# Patient Record
Sex: MALE | Race: BLACK OR AFRICAN AMERICAN | NOT HISPANIC OR LATINO | Employment: FULL TIME | RURAL
[De-identification: names, ages, dates, MRNs, and addresses within clinical notes are randomized per-mention and may not be internally consistent; named-entity substitution may affect disease eponyms.]

---

## 2018-08-29 ENCOUNTER — OFFICE VISIT (OUTPATIENT)
Dept: OTOLARYNGOLOGY | Facility: CLINIC | Age: 28
End: 2018-08-29

## 2018-08-29 ENCOUNTER — PREP FOR SURGERY (OUTPATIENT)
Dept: OTHER | Facility: HOSPITAL | Age: 28
End: 2018-08-29

## 2018-08-29 VITALS — BODY MASS INDEX: 30.05 KG/M2 | HEIGHT: 66 IN | WEIGHT: 187 LBS | TEMPERATURE: 97.8 F

## 2018-08-29 DIAGNOSIS — J34.3 NASAL TURBINATE HYPERTROPHY: ICD-10-CM

## 2018-08-29 DIAGNOSIS — J34.89 NASAL VALVE COLLAPSE: ICD-10-CM

## 2018-08-29 DIAGNOSIS — J34.2 NASAL SEPTAL DEVIATION: ICD-10-CM

## 2018-08-29 DIAGNOSIS — J34.2 NASAL SEPTAL DEVIATION: Primary | ICD-10-CM

## 2018-08-29 DIAGNOSIS — J34.89 NASAL VALVE COLLAPSE: Primary | ICD-10-CM

## 2018-08-29 PROCEDURE — 99204 OFFICE O/P NEW MOD 45 MIN: CPT | Performed by: OTOLARYNGOLOGY

## 2018-08-29 RX ORDER — OXYMETAZOLINE HYDROCHLORIDE 0.05 G/100ML
2 SPRAY NASAL
Status: CANCELLED | OUTPATIENT
Start: 2018-09-14 | End: 2018-09-17

## 2018-08-29 RX ORDER — CLINDAMYCIN PHOSPHATE 900 MG/50ML
900 INJECTION INTRAVENOUS ONCE
Status: CANCELLED | OUTPATIENT
Start: 2018-09-14 | End: 2018-09-14

## 2018-08-29 NOTE — PROGRESS NOTES
Subjective   Markell Pantoja is a 28 y.o. male.    nasal obstruction  History of Present Illness     The patient has had many treatments antihistamines steroids and treatments for sinusitis and nasal obstruction allergies and had allergy shots.  He says CT which showed no evidence of active sinusitis but had rolled system arms bilateral sinuses.  Concerned because of mouth breathing dry mouth difficulty breathing through his nose on a chronic basis.  He doesn't want continue further medical therapies been treated by an allergist as well as others for this for many years he notes specifically history of trauma cannot breathe well through either side of his nose he has trouble with his sense of smell he has migraines which which is treated separately  Been diagnosed with nasal obstruction secondary to deviated septum and turbinate hypertrophy  The following portions of the patient's history were reviewed and updated as appropriate: allergies, current medications, past family history, past medical history, past social history, past surgical history and problem list.      Markell Pantoja reports that he has quit smoking. He has never used smokeless tobacco. He reports that he does not drink alcohol.  Patient is not a tobacco user and has been counseled for use of tobacco products    History reviewed. No pertinent family history.    No current outpatient prescriptions on file.    No Known Allergies    History reviewed. No pertinent past medical history.      Review of Systems   Constitutional: Negative for fever.   HENT: Positive for congestion, postnasal drip and rhinorrhea. Negative for facial swelling.    Neurological: Positive for headaches.        Very poor sense of smell   All other systems reviewed and are negative.          Objective   Physical Exam   Constitutional: He is oriented to person, place, and time. He appears well-developed and well-nourished.   HENT:   Head: Normocephalic and atraumatic.    Right Ear: Hearing, tympanic membrane, external ear and ear canal normal.   Left Ear: Hearing, tympanic membrane, external ear and ear canal normal.   Nose: Mucosal edema, nasal deformity and septal deviation present. No rhinorrhea. No epistaxis. Right sinus exhibits no maxillary sinus tenderness and no frontal sinus tenderness. Left sinus exhibits no maxillary sinus tenderness and no frontal sinus tenderness.       Mouth/Throat: Uvula is midline and oropharynx is clear and moist. No trismus in the jaw. Normal dentition. No oropharyngeal exudate or posterior oropharyngeal edema.   Eyes: Conjunctivae are normal.   Neck: Trachea normal, normal range of motion and phonation normal. Neck supple. No JVD present. No tracheal deviation present. No thyromegaly present.   Cardiovascular: Normal rate and regular rhythm.    Pulmonary/Chest: Effort normal and breath sounds normal.   Musculoskeletal: Normal range of motion.   Lymphadenopathy:        Head (right side): No submental, no submandibular, no tonsillar, no preauricular, no posterior auricular and no occipital adenopathy present.        Head (left side): No submental, no submandibular, no tonsillar, no preauricular, no posterior auricular and no occipital adenopathy present.     He has no cervical adenopathy.        Right cervical: No superficial cervical, no deep cervical and no posterior cervical adenopathy present.       Left cervical: No superficial cervical, no deep cervical and no posterior cervical adenopathy present.   Neurological: He is alert and oriented to person, place, and time. No cranial nerve deficit.   Skin: Skin is warm.   Psychiatric: He has a normal mood and affect. His speech is normal and behavior is normal. Thought content normal.   Nursing note and vitals reviewed.      [facility] and treatments and also description of the CT scan and these were all reviewed      Assessment/Plan    Markell was seen today for deviated septum.    Diagnoses and all orders for this visit:    Nasal valve collapse    Nasal turbinate hypertrophy    Nasal septal deviation        Mentioned further medical therapy though after mother trauma the medications.  He's Mahamed because his failed therapy with an allergist wants to consider surgery.  Explained that we'll do surgery carries sense of smell that there is a good success rate but is not perfect there are risk of surgery including change in appearance, infection, bleeding, septal perforation, delayed bleeding chronic pain discomfort failure to improve need for further surgery he understands wants to go through surgery all questions were answered to sit up near future.  He states is otherwise healthy had previous surgeon anesthesia without complication  Explained and its not likely this headaches which are probably migrainous will be improved at all by the surgery he understands and accepts.  He denies any irregular bleeding tendency or family history of same

## 2018-08-29 NOTE — PATIENT INSTRUCTIONS

## 2018-09-10 ENCOUNTER — APPOINTMENT (OUTPATIENT)
Dept: PREADMISSION TESTING | Facility: HOSPITAL | Age: 28
End: 2018-09-10

## 2018-09-10 VITALS
HEART RATE: 66 BPM | WEIGHT: 185 LBS | OXYGEN SATURATION: 98 % | BODY MASS INDEX: 29.73 KG/M2 | RESPIRATION RATE: 12 BRPM | SYSTOLIC BLOOD PRESSURE: 102 MMHG | HEIGHT: 66 IN | DIASTOLIC BLOOD PRESSURE: 68 MMHG

## 2018-09-10 RX ORDER — SODIUM CHLORIDE, SODIUM GLUCONATE, SODIUM ACETATE, POTASSIUM CHLORIDE, AND MAGNESIUM CHLORIDE 526; 502; 368; 37; 30 MG/100ML; MG/100ML; MG/100ML; MG/100ML; MG/100ML
1000 INJECTION, SOLUTION INTRAVENOUS CONTINUOUS
Status: CANCELLED | OUTPATIENT
Start: 2018-09-14

## 2018-09-10 RX ORDER — ACETAMINOPHEN 500 MG
500 TABLET ORAL EVERY 6 HOURS PRN
COMMUNITY

## 2018-09-10 RX ORDER — ALBUTEROL SULFATE 90 UG/1
2 AEROSOL, METERED RESPIRATORY (INHALATION) EVERY 4 HOURS PRN
COMMUNITY

## 2018-09-10 RX ORDER — IBUPROFEN 200 MG
200 TABLET ORAL EVERY 6 HOURS PRN
COMMUNITY
End: 2018-09-14 | Stop reason: HOSPADM

## 2018-09-13 ENCOUNTER — ANESTHESIA EVENT (OUTPATIENT)
Dept: PERIOP | Facility: HOSPITAL | Age: 28
End: 2018-09-13

## 2018-09-13 NOTE — H&P
Subjective      Markell Pantoja is a 28 y.o. male.    nasal obstruction  History of Present Illness      The patient has had many treatments antihistamines steroids and treatments for sinusitis and nasal obstruction allergies and had allergy shots.  He says CT which showed no evidence of active sinusitis but had rolled system arms bilateral sinuses.  Concerned because of mouth breathing dry mouth difficulty breathing through his nose on a chronic basis.  He doesn't want continue further medical therapies been treated by an allergist as well as others for this for many years he notes specifically history of trauma cannot breathe well through either side of his nose he has trouble with his sense of smell he has migraines which which is treated separately  Been diagnosed with nasal obstruction secondary to deviated septum and turbinate hypertrophy  The following portions of the patient's history were reviewed and updated as appropriate: allergies, current medications, past family history, past medical history, past social history, past surgical history and problem list.        Markell Pantoja reports that he has quit smoking. He has never used smokeless tobacco. He reports that he does not drink alcohol.  Patient is not a tobacco user and has been counseled for use of tobacco products     History reviewed. No pertinent family history.     No current outpatient prescriptions on file.     No Known Allergies     Medical History   History reviewed. No pertinent past medical history.           Review of Systems   Constitutional: Negative for fever.   HENT: Positive for congestion, postnasal drip and rhinorrhea. Negative for facial swelling.    Neurological: Positive for headaches.        Very poor sense of smell   All other systems reviewed and are negative.                 Objective      Physical Exam   Constitutional: He is oriented to person, place, and time. He appears well-developed and well-nourished.    HENT:   Head: Normocephalic and atraumatic.   Right Ear: Hearing, tympanic membrane, external ear and ear canal normal.   Left Ear: Hearing, tympanic membrane, external ear and ear canal normal.   Nose: Mucosal edema, nasal deformity and septal deviation present. No rhinorrhea. No epistaxis. Right sinus exhibits no maxillary sinus tenderness and no frontal sinus tenderness. Left sinus exhibits no maxillary sinus tenderness and no frontal sinus tenderness.       Mouth/Throat: Uvula is midline and oropharynx is clear and moist. No trismus in the jaw. Normal dentition. No oropharyngeal exudate or posterior oropharyngeal edema.   Eyes: Conjunctivae are normal.   Neck: Trachea normal, normal range of motion and phonation normal. Neck supple. No JVD present. No tracheal deviation present. No thyromegaly present.   Cardiovascular: Normal rate and regular rhythm.    Pulmonary/Chest: Effort normal and breath sounds normal.   Musculoskeletal: Normal range of motion.   Lymphadenopathy:        Head (right side): No submental, no submandibular, no tonsillar, no preauricular, no posterior auricular and no occipital adenopathy present.        Head (left side): No submental, no submandibular, no tonsillar, no preauricular, no posterior auricular and no occipital adenopathy present.     He has no cervical adenopathy.        Right cervical: No superficial cervical, no deep cervical and no posterior cervical adenopathy present.       Left cervical: No superficial cervical, no deep cervical and no posterior cervical adenopathy present.   Neurological: He is alert and oriented to person, place, and time. No cranial nerve deficit.   Skin: Skin is warm.   Psychiatric: He has a normal mood and affect. His speech is normal and behavior is normal. Thought content normal.   Nursing note and vitals reviewed.        [facility] showing [facility] and treatments and also description of the CT scan and  these were all reviewed           Assessment/Plan      Markell was seen today for deviated septum.     Diagnoses and all orders for this visit:     Nasal valve collapse     Nasal turbinate hypertrophy     Nasal septal deviation           Mentioned further medical therapy though after mother trauma the medications.  He's  already because his failed therapy with an allergist wants to consider surgery.  Explained that we'll do surgery  May not helpsense of smell that there is a good success rate but is not perfect there are risk of surgery including change in appearance, infection, bleeding, septal perforation, delayed bleeding chronic pain discomfort failure to improve need for further surgery he understands wants to go through surgery all questions were answered to sit up near future.  He states is otherwise healthy had previous surgeon anesthesia without complication  Explained and its not likely this headaches which are probably migrainous will be improved at all by the surgery he understands and accepts.  He denies any irregular bleeding tendency or family history of same                                     Jacinta Winn

## 2018-09-14 ENCOUNTER — HOSPITAL ENCOUNTER (OUTPATIENT)
Facility: HOSPITAL | Age: 28
Setting detail: HOSPITAL OUTPATIENT SURGERY
Discharge: HOME | End: 2018-09-14
Attending: OTOLARYNGOLOGY | Admitting: OTOLARYNGOLOGY

## 2018-09-14 ENCOUNTER — ANESTHESIA (OUTPATIENT)
Dept: PERIOP | Facility: HOSPITAL | Age: 28
End: 2018-09-14

## 2018-09-14 VITALS
TEMPERATURE: 97.1 F | HEART RATE: 86 BPM | HEIGHT: 66 IN | OXYGEN SATURATION: 100 % | DIASTOLIC BLOOD PRESSURE: 71 MMHG | WEIGHT: 183.64 LBS | SYSTOLIC BLOOD PRESSURE: 138 MMHG | BODY MASS INDEX: 29.51 KG/M2 | RESPIRATION RATE: 20 BRPM

## 2018-09-14 DIAGNOSIS — J34.3 NASAL TURBINATE HYPERTROPHY: ICD-10-CM

## 2018-09-14 DIAGNOSIS — J34.89 NASAL VALVE COLLAPSE: ICD-10-CM

## 2018-09-14 DIAGNOSIS — J34.2 NASAL SEPTAL DEVIATION: ICD-10-CM

## 2018-09-14 PROCEDURE — 25010000002 NEOSTIGMINE 4 MG/4ML SOLUTION PREFILLED SYRINGE: Performed by: NURSE ANESTHETIST, CERTIFIED REGISTERED

## 2018-09-14 PROCEDURE — 25010000002 DEXAMETHASONE PER 1 MG: Performed by: NURSE ANESTHETIST, CERTIFIED REGISTERED

## 2018-09-14 PROCEDURE — 25010000002 MIDAZOLAM PER 1 MG: Performed by: NURSE ANESTHETIST, CERTIFIED REGISTERED

## 2018-09-14 PROCEDURE — 30520 REPAIR OF NASAL SEPTUM: CPT | Performed by: OTOLARYNGOLOGY

## 2018-09-14 PROCEDURE — 25010000002 PROPOFOL 10 MG/ML EMULSION: Performed by: NURSE ANESTHETIST, CERTIFIED REGISTERED

## 2018-09-14 PROCEDURE — 88304 TISSUE EXAM BY PATHOLOGIST: CPT | Performed by: OTOLARYNGOLOGY

## 2018-09-14 PROCEDURE — 30465 REPAIR NASAL STENOSIS: CPT | Performed by: OTOLARYNGOLOGY

## 2018-09-14 PROCEDURE — 88304 TISSUE EXAM BY PATHOLOGIST: CPT | Performed by: PATHOLOGY

## 2018-09-14 PROCEDURE — 25010000002 DEXAMETHASONE PER 1 MG: Performed by: OTOLARYNGOLOGY

## 2018-09-14 PROCEDURE — 30130 EXCISE INFERIOR TURBINATE: CPT | Performed by: OTOLARYNGOLOGY

## 2018-09-14 PROCEDURE — 25010000002 FENTANYL CITRATE (PF) 100 MCG/2ML SOLUTION: Performed by: NURSE ANESTHETIST, CERTIFIED REGISTERED

## 2018-09-14 RX ORDER — FENTANYL CITRATE 50 UG/ML
INJECTION, SOLUTION INTRAMUSCULAR; INTRAVENOUS AS NEEDED
Status: DISCONTINUED | OUTPATIENT
Start: 2018-09-14 | End: 2018-09-14 | Stop reason: SURG

## 2018-09-14 RX ORDER — GLYCOPYRROLATE 0.2 MG/ML
INJECTION INTRAMUSCULAR; INTRAVENOUS AS NEEDED
Status: DISCONTINUED | OUTPATIENT
Start: 2018-09-14 | End: 2018-09-14 | Stop reason: SURG

## 2018-09-14 RX ORDER — ACETAMINOPHEN 650 MG/1
650 SUPPOSITORY RECTAL ONCE AS NEEDED
Status: DISCONTINUED | OUTPATIENT
Start: 2018-09-14 | End: 2018-09-14 | Stop reason: HOSPADM

## 2018-09-14 RX ORDER — EPHEDRINE SULFATE 50 MG/ML
5 INJECTION, SOLUTION INTRAVENOUS ONCE AS NEEDED
Status: DISCONTINUED | OUTPATIENT
Start: 2018-09-14 | End: 2018-09-14 | Stop reason: HOSPADM

## 2018-09-14 RX ORDER — LABETALOL HYDROCHLORIDE 5 MG/ML
5 INJECTION, SOLUTION INTRAVENOUS
Status: DISCONTINUED | OUTPATIENT
Start: 2018-09-14 | End: 2018-09-14 | Stop reason: HOSPADM

## 2018-09-14 RX ORDER — LIDOCAINE HYDROCHLORIDE AND EPINEPHRINE 10; 10 MG/ML; UG/ML
INJECTION, SOLUTION INFILTRATION; PERINEURAL AS NEEDED
Status: DISCONTINUED | OUTPATIENT
Start: 2018-09-14 | End: 2018-09-14 | Stop reason: HOSPADM

## 2018-09-14 RX ORDER — LIDOCAINE HYDROCHLORIDE 20 MG/ML
INJECTION, SOLUTION INFILTRATION; PERINEURAL AS NEEDED
Status: DISCONTINUED | OUTPATIENT
Start: 2018-09-14 | End: 2018-09-14 | Stop reason: SURG

## 2018-09-14 RX ORDER — SODIUM CHLORIDE, SODIUM GLUCONATE, SODIUM ACETATE, POTASSIUM CHLORIDE, AND MAGNESIUM CHLORIDE 526; 502; 368; 37; 30 MG/100ML; MG/100ML; MG/100ML; MG/100ML; MG/100ML
1000 INJECTION, SOLUTION INTRAVENOUS CONTINUOUS
Status: DISCONTINUED | OUTPATIENT
Start: 2018-09-14 | End: 2018-09-14 | Stop reason: HOSPADM

## 2018-09-14 RX ORDER — ONDANSETRON 2 MG/ML
4 INJECTION INTRAMUSCULAR; INTRAVENOUS ONCE AS NEEDED
Status: DISCONTINUED | OUTPATIENT
Start: 2018-09-14 | End: 2018-09-14 | Stop reason: HOSPADM

## 2018-09-14 RX ORDER — DEXAMETHASONE SODIUM PHOSPHATE 10 MG/ML
8 INJECTION INTRAMUSCULAR; INTRAVENOUS ONCE
Status: COMPLETED | OUTPATIENT
Start: 2018-09-14 | End: 2018-09-14

## 2018-09-14 RX ORDER — PROPOFOL 10 MG/ML
VIAL (ML) INTRAVENOUS AS NEEDED
Status: DISCONTINUED | OUTPATIENT
Start: 2018-09-14 | End: 2018-09-14 | Stop reason: SURG

## 2018-09-14 RX ORDER — MEPERIDINE HYDROCHLORIDE 50 MG/ML
12.5 INJECTION INTRAMUSCULAR; INTRAVENOUS; SUBCUTANEOUS
Status: DISCONTINUED | OUTPATIENT
Start: 2018-09-14 | End: 2018-09-14 | Stop reason: HOSPADM

## 2018-09-14 RX ORDER — NEOSTIGMINE METHYLSULFATE 4 MG/4 ML
SYRINGE (ML) INTRAVENOUS AS NEEDED
Status: DISCONTINUED | OUTPATIENT
Start: 2018-09-14 | End: 2018-09-14 | Stop reason: SURG

## 2018-09-14 RX ORDER — ACETAMINOPHEN 325 MG/1
650 TABLET ORAL ONCE AS NEEDED
Status: DISCONTINUED | OUTPATIENT
Start: 2018-09-14 | End: 2018-09-14 | Stop reason: HOSPADM

## 2018-09-14 RX ORDER — CEFDINIR 300 MG/1
300 CAPSULE ORAL 2 TIMES DAILY
Qty: 20 CAPSULE | Refills: 0 | Status: SHIPPED | OUTPATIENT
Start: 2018-09-14 | End: 2018-09-26

## 2018-09-14 RX ORDER — HYDROCODONE BITARTRATE AND ACETAMINOPHEN 5; 325 MG/1; MG/1
1 TABLET ORAL ONCE AS NEEDED
Status: DISCONTINUED | OUTPATIENT
Start: 2018-09-14 | End: 2018-09-14 | Stop reason: HOSPADM

## 2018-09-14 RX ORDER — ROCURONIUM BROMIDE 10 MG/ML
INJECTION, SOLUTION INTRAVENOUS AS NEEDED
Status: DISCONTINUED | OUTPATIENT
Start: 2018-09-14 | End: 2018-09-14 | Stop reason: SURG

## 2018-09-14 RX ORDER — FLUMAZENIL 0.1 MG/ML
0.2 INJECTION INTRAVENOUS AS NEEDED
Status: DISCONTINUED | OUTPATIENT
Start: 2018-09-14 | End: 2018-09-14 | Stop reason: HOSPADM

## 2018-09-14 RX ORDER — NALOXONE HCL 0.4 MG/ML
0.2 VIAL (ML) INJECTION AS NEEDED
Status: DISCONTINUED | OUTPATIENT
Start: 2018-09-14 | End: 2018-09-14 | Stop reason: HOSPADM

## 2018-09-14 RX ORDER — CLINDAMYCIN PHOSPHATE 900 MG/50ML
900 INJECTION INTRAVENOUS ONCE
Status: COMPLETED | OUTPATIENT
Start: 2018-09-14 | End: 2018-09-14

## 2018-09-14 RX ORDER — DIPHENHYDRAMINE HYDROCHLORIDE 50 MG/ML
12.5 INJECTION INTRAMUSCULAR; INTRAVENOUS
Status: DISCONTINUED | OUTPATIENT
Start: 2018-09-14 | End: 2018-09-14 | Stop reason: HOSPADM

## 2018-09-14 RX ORDER — OXYMETAZOLINE HYDROCHLORIDE 0.05 G/100ML
2 SPRAY NASAL
Status: DISCONTINUED | OUTPATIENT
Start: 2018-09-14 | End: 2018-09-14 | Stop reason: HOSPADM

## 2018-09-14 RX ORDER — HYDROCODONE BITARTRATE AND ACETAMINOPHEN 5; 325 MG/1; MG/1
1-2 TABLET ORAL EVERY 4 HOURS PRN
Qty: 24 TABLET | Refills: 0 | Status: SHIPPED | OUTPATIENT
Start: 2018-09-14 | End: 2018-09-26

## 2018-09-14 RX ORDER — MIDAZOLAM HYDROCHLORIDE 1 MG/ML
INJECTION INTRAMUSCULAR; INTRAVENOUS AS NEEDED
Status: DISCONTINUED | OUTPATIENT
Start: 2018-09-14 | End: 2018-09-14 | Stop reason: SURG

## 2018-09-14 RX ORDER — DEXAMETHASONE SODIUM PHOSPHATE 4 MG/ML
INJECTION, SOLUTION INTRA-ARTICULAR; INTRALESIONAL; INTRAMUSCULAR; INTRAVENOUS; SOFT TISSUE AS NEEDED
Status: DISCONTINUED | OUTPATIENT
Start: 2018-09-14 | End: 2018-09-14 | Stop reason: SURG

## 2018-09-14 RX ADMIN — SODIUM CHLORIDE, SODIUM GLUCONATE, SODIUM ACETATE, POTASSIUM CHLORIDE, AND MAGNESIUM CHLORIDE 1000 ML: 526; 502; 368; 37; 30 INJECTION, SOLUTION INTRAVENOUS at 07:00

## 2018-09-14 RX ADMIN — GLYCOPYRROLATE 0.4 MG: 0.2 INJECTION, SOLUTION INTRAMUSCULAR; INTRAVENOUS at 09:32

## 2018-09-14 RX ADMIN — SODIUM CHLORIDE, SODIUM GLUCONATE, SODIUM ACETATE, POTASSIUM CHLORIDE, AND MAGNESIUM CHLORIDE: 526; 502; 368; 37; 30 INJECTION, SOLUTION INTRAVENOUS at 09:37

## 2018-09-14 RX ADMIN — Medication 2 MG: at 09:32

## 2018-09-14 RX ADMIN — MIDAZOLAM HYDROCHLORIDE 2 MG: 2 INJECTION, SOLUTION INTRAMUSCULAR; INTRAVENOUS at 08:22

## 2018-09-14 RX ADMIN — DEXAMETHASONE SODIUM PHOSPHATE 8 MG: 10 INJECTION, SOLUTION INTRAMUSCULAR; INTRAVENOUS at 07:17

## 2018-09-14 RX ADMIN — DEXAMETHASONE SODIUM PHOSPHATE 8 MG: 4 INJECTION, SOLUTION INTRAMUSCULAR; INTRAVENOUS at 08:38

## 2018-09-14 RX ADMIN — LIDOCAINE HYDROCHLORIDE 80 MG: 20 INJECTION, SOLUTION INFILTRATION; PERINEURAL at 08:29

## 2018-09-14 RX ADMIN — CLINDAMYCIN IN 5 PERCENT DEXTROSE 900 MG: 18 INJECTION, SOLUTION INTRAVENOUS at 08:36

## 2018-09-14 RX ADMIN — OXYMETAZOLINE HYDROCHLORIDE 2 SPRAY: 5 SPRAY NASAL at 06:58

## 2018-09-14 RX ADMIN — PROPOFOL 200 MG: 10 INJECTION, EMULSION INTRAVENOUS at 08:29

## 2018-09-14 RX ADMIN — ROCURONIUM BROMIDE 5 MG: 10 INJECTION INTRAVENOUS at 08:29

## 2018-09-14 RX ADMIN — ROCURONIUM BROMIDE 25 MG: 10 INJECTION INTRAVENOUS at 08:44

## 2018-09-14 RX ADMIN — FENTANYL CITRATE 100 MCG: 50 INJECTION, SOLUTION INTRAMUSCULAR; INTRAVENOUS at 08:29

## 2018-09-14 RX ADMIN — SODIUM CHLORIDE, SODIUM GLUCONATE, SODIUM ACETATE, POTASSIUM CHLORIDE, AND MAGNESIUM CHLORIDE: 526; 502; 368; 37; 30 INJECTION, SOLUTION INTRAVENOUS at 09:34

## 2018-09-14 NOTE — ANESTHESIA POSTPROCEDURE EVALUATION
Patient: Markell Pantoja    Procedure Summary     Date:  09/14/18 Room / Location:  [facility] OR  / [facility] OR    Anesthesia Start:  0826 Anesthesia Stop:  1003    Procedure:  NASAL VALVE REPAIR,NASAL SEPTOPLASTY,BILATERAL INFERIOR PARTIAL TURBINECTOMIES (Bilateral ) Diagnosis:       Nasal turbinate hypertrophy      Nasal septal deviation      Nasal valve collapse      (Nasal turbinate hypertrophy [J34.3])      (Nasal septal deviation [J34.2])      (Nasal valve collapse [J34.89])    Surgeon:  Lico Forman MD Provider:  Thaddeus Varner MD    Anesthesia Type:  general ASA Status:  3          Anesthesia Type: general  Last vitals  BP   127/63 (09/14/18 0948)   Temp   98.9 °F (37.2 °C) (09/14/18 0948)   Pulse   96 (09/14/18 0948)   Resp   14 (09/14/18 0948)     SpO2   100 % (09/14/18 0948)     Post Anesthesia Care and Evaluation    Patient location during evaluation: bedside  Patient participation: waiting for patient participation  Level of consciousness: responsive to noxious stimuli  Pain management: adequate  Airway patency: patent  Anesthetic complications: No anesthetic complications    Cardiovascular status: acceptable  Respiratory status: acceptable, oral airway, airway suctioned and face mask  Hydration status: acceptable

## 2018-09-14 NOTE — ANESTHESIA PREPROCEDURE EVALUATION
Anesthesia Evaluation     Patient summary reviewed and Nursing notes reviewed   no history of anesthetic complications:  NPO Solid Status: > 8 hours  NPO Liquid Status: > 8 hours           Airway   Mallampati: I  TM distance: >3 FB  Neck ROM: full  possible difficult intubation  Dental - normal exam     Comment: Upper incisors with small chipping.    Pulmonary - normal exam    breath sounds clear to auscultation  (+) a smoker Former, asthma, sleep apnea (Secondary to nasal turbinate hypertrophy.) on CPAP,   Cardiovascular - negative cardio ROS and normal exam    Rhythm: regular  Rate: normal    (-) murmur      Neuro/Psych- negative ROS  GI/Hepatic/Renal/Endo - negative ROS     Musculoskeletal (-) negative ROS    Abdominal    Substance History - negative use     OB/GYN negative ob/gyn ROS         Other - negative ROS                       Anesthesia Plan    ASA 3     general     intravenous induction   Anesthetic plan, all risks, benefits, and alternatives have been provided, discussed and informed consent has been obtained with: patient and spouse/significant other.

## 2018-09-14 NOTE — DISCHARGE INSTRUCTIONS
Begin nasal irrigation at least tid in am  No nose blowing , strenuous activity for 1 week  No bending or lifting

## 2018-09-14 NOTE — OP NOTE
OPERATIVE NOTE    Name:    Markell Pantoja  YOB: 1990  Date of surgery:   9/14/2018    Pre-op Diagnosis:   Nasal turbinate hypertrophy [J34.3]  Nasal septal deviation [J34.2]  Nasal valve collapse [J34.89]    Post-op Diagnosis:    Post-Op Diagnosis Codes:     * Nasal turbinate hypertrophy [J34.3]     * Nasal septal deviation [J34.2]     * Nasal valve collapse [J34.89]    Procedure:  Procedure(s):  NASAL VALVE REPAIR,NASAL SEPTOPLASTY,BILATERAL INFERIOR PARTIAL TURBINECTOMIES    Surgeon:  Lico Forman MD, AAOHNS    Anesthesia: General and local infiltration as below    Staff:   Circulator: Johanne Marroquin RN; Ashley Bhakta RN  Scrub Person: Anaid Gordillo; Nata Canseco  Assistant: Shaunna Alvarenga    Estimated Blood Loss: 15 ml  Specimens:                Septum and turbinares       Drains:  none    Findings:  Severe NSD L>R and b/l valve collapse  And turbinate hypertrophy R>L    Complications: None    IMPLANTS:   Nothing was implanted during the procedure    INDICATIONS: Patient has known history of sleep apnea is tried multiple antihistamines decongestant nasal sprays allergy therapy has not been able to improve his breathing has a documented nasal for deviation both on exam and CT on exam he also has bilateral valve collapse marked turbinate hypertrophy right worse than left and mod severe deviation anteriorly of this cartilaginous septum.  There is benefits complications and no guarantees are given that we solve his problem with the risk recovery issues related to work and also bleeding, infection, nasal changes appearance septal perforation need for further surgery L response of therapy need for further medical therapy and received sleep apnea and allergies were not be cured by the surgery.    PROCEDURE:  Patient was taken operating room placed in supine position general anesthesia carried out.  Timeout was carried out.  Nose injected along the septum turbinates a  total of roughly 13 mL 1% lidocaine with 1 1000 epinephrine.  The inferior turbinates are outfractured still quite large and utilizing the microdebrider they were submucosally and also on the inferior aspect debrided of both bone and mucosa.  Are then outfractured and cauterized along the  fear mucosal border.      Attention was then taken to the nasal septum were hemitransfixion incision made in the left mucoperichondrial periosteal flap elevated bony cartilaginous junction  large part of deviated bone was removed very minimal cartilage removed with chronic chest and shaving there is a large L-shaped strut left in place and the multiple sutures were then used to coapt the mucosal flap consisting of chromic suture.    This improved the airway significantly but there still valve collapse on both sides excess was then removed from the intercartilaginous area on both sides of her lower upper and lower lateral cartilages were sutured together strengthening the valve collapse and plan septum auricular mucosa this was done with PDS suture.    Cartilage resulting from the septum.  I reinspected and the pharynx reinspected and noted Afrin soaked pledgets placed and nose.  With the patient waking up the they were then removed.  The patient was taken to the recovery room in stable condition.  Much improved airway.    Instructions re wound care previously given the patient were then reinforced with the family and prescriptions provided.            This document has been electronically signed by Lico Forman MD on September 14, 2018 9:44 AM

## 2018-09-14 NOTE — ANESTHESIA PROCEDURE NOTES
Airway  Urgency: elective    Airway not difficult    General Information and Staff    Patient location during procedure: pre-op  CRNA: AUDIE FREEMAN    Indications and Patient Condition  Indications for airway management: airway protection    Preoxygenated: yes  MILS maintained throughout  Mask difficulty assessment: 1 - vent by mask    Final Airway Details  Final airway type: endotracheal airway      Successful airway: ETT and VIVIANA tube  Cuffed: yes   Successful intubation technique: direct laryngoscopy  Facilitating devices/methods: intubating stylet and cricoid pressure  Endotracheal tube insertion site: oral  Blade: Abran  Blade size: 4  ETT size: 7.5 mm  Cormack-Lehane Classification: grade IIb - view of arytenoids or posterior of glottis only  Placement verified by: chest auscultation and capnometry   Measured from: lips  ETT to teeth (cm): 22  Number of attempts at approach: 2

## 2018-09-14 NOTE — INTERVAL H&P NOTE
Pt seen and no new issues noted.  All questions answered.  Vitals reviewed.   Plan for Nasal septal and nasal valve surgery and bilateral turbinate reduction surgery.  GABRIELLE Forman MD

## 2018-09-17 LAB
LAB AP CASE REPORT: NORMAL
PATH REPORT.FINAL DX SPEC: NORMAL
PATH REPORT.GROSS SPEC: NORMAL

## 2018-09-26 ENCOUNTER — OFFICE VISIT (OUTPATIENT)
Dept: OTOLARYNGOLOGY | Facility: CLINIC | Age: 28
End: 2018-09-26

## 2018-09-26 VITALS — HEIGHT: 66 IN | TEMPERATURE: 96.9 F | BODY MASS INDEX: 29.25 KG/M2 | WEIGHT: 182 LBS

## 2018-09-26 DIAGNOSIS — Z09 POSTOP CHECK: Primary | ICD-10-CM

## 2018-09-26 PROCEDURE — 99024 POSTOP FOLLOW-UP VISIT: CPT | Performed by: OTOLARYNGOLOGY

## 2018-09-26 NOTE — PATIENT INSTRUCTIONS

## 2018-09-26 NOTE — PROGRESS NOTES
Patient comes back in follow-up after nasal surgery.  He said he does not have any unusual pain and feeling better not needing a strong pain medicine.  His no signs of infection    Examination reveals some old crust along the turbinates and septum are removed all sutures which were loose.  He medially can breathe better.  He was no bleeding after removal of the crusting    Discussed the importance of continuing nasal saline irrigation at least twice a day coughing problems or questions see him back in 3-4 weeks his convenience recheck he can increase his activity and blows nose as needed for discussed the critical need for rinsing his nose I see no evidence of problems with healing known septal perforation his airway is much improved.  GABRIELLE Forman M.D.

## (undated) DEVICE — BLD BIPOL DIEGO SMR STR STD TYPE A 2MM

## (undated) DEVICE — DRSNG TELFA PAD NONADH STR 1S 3X8IN

## (undated) DEVICE — COAGULATOR SXN HNDSWITCH 10F16IN

## (undated) DEVICE — TBG DECLOG DIEGO ELITE MULTIDEBRIDER ST

## (undated) DEVICE — Device

## (undated) DEVICE — SUT PDS 4-0 RB-1 Z304H

## (undated) DEVICE — PK ENT LF 60

## (undated) DEVICE — STERILE POLYISOPRENE POWDER-FREE SURGICAL GLOVES WITH EMOLLIENT COATING: Brand: PROTEXIS

## (undated) DEVICE — DENTAL NEEDLE,METAL HUB,27 G LONG: Brand: MONOJECT

## (undated) DEVICE — CONTAINER,SPECIMEN,OR STERILE,4OZ: Brand: MEDLINE

## (undated) DEVICE — GLV SURG SENSICARE GREEN W/ALOE PF LF 6.5 STRL

## (undated) DEVICE — GLV SURG SENSICARE PI PF LF 7 GRN STRL

## (undated) DEVICE — CODMAN® SURGICAL PATTIES 1/2" X 3" (1.27CM X 7.62CM): Brand: CODMAN®

## (undated) DEVICE — INTENDED FOR TISSUE SEPARATION, AND OTHER PROCEDURES THAT REQUIRE A SHARP SURGICAL BLADE TO PUNCTURE OR CUT.: Brand: BARD-PARKER ® CARBON RIB-BACK BLADES

## (undated) DEVICE — TP SXN YANKR BLB TIP W/TBG 10F LF STRL

## (undated) DEVICE — SUT PLAIN 1 4/0 1828H

## (undated) DEVICE — GLV SURG SENSICARE POLYISPRN W/ALOE PF LF 6.5 GRN STRL

## (undated) DEVICE — SOL IRR NACL 0.9PCT BT 1000ML

## (undated) DEVICE — GOWN,AURORA,NOREINF,RAGLAN,XL,STERILE: Brand: MEDLINE

## (undated) DEVICE — UNDYED BRAIDED (POLYGLACTIN 910), SYNTHETIC ABSORBABLE SUTURE: Brand: COATED VICRYL

## (undated) DEVICE — GLV SURG TRIUMPH LT PF LTX 7.5 STRL

## (undated) DEVICE — TRY IRR